# Patient Record
Sex: FEMALE | Race: WHITE | Employment: FULL TIME | URBAN - METROPOLITAN AREA
[De-identification: names, ages, dates, MRNs, and addresses within clinical notes are randomized per-mention and may not be internally consistent; named-entity substitution may affect disease eponyms.]

---

## 2019-05-17 LAB
C. TRACHOMATIS, EXTERNAL RESULT: NEGATIVE
N. GONORRHOEAE, EXTERNAL RESULT: NEGATIVE

## 2019-06-14 LAB
ABO, EXTERNAL RESULT: NORMAL
HEP B, EXTERNAL RESULT: NEGATIVE
HIV, EXTERNAL RESULT: NEGATIVE
RH FACTOR, EXTERNAL RESULT: POSITIVE
RPR, EXTERNAL RESULT: NONREACTIVE
RUBELLA TITER, EXTERNAL RESULT: NORMAL

## 2019-10-26 ENCOUNTER — HOSPITAL ENCOUNTER (OUTPATIENT)
Dept: OBGYN | Age: 39
Discharge: HOME | End: 2019-10-26

## 2019-10-26 PROCEDURE — S9444 PARENTING CLASS: HCPCS

## 2019-12-05 LAB — GBS, EXTERNAL RESULT: NEGATIVE

## 2019-12-30 ENCOUNTER — APPOINTMENT (OUTPATIENT)
Dept: LABOR AND DELIVERY | Age: 39
End: 2019-12-30
Payer: COMMERCIAL

## 2019-12-30 ENCOUNTER — HOSPITAL ENCOUNTER (INPATIENT)
Age: 39
LOS: 2 days | Discharge: HOME | End: 2020-01-01
Attending: OBSTETRICS & GYNECOLOGY | Admitting: OBSTETRICS & GYNECOLOGY
Payer: COMMERCIAL

## 2019-12-30 PROBLEM — Z34.80 NORMAL PREGNANCY IN MULTIGRAVIDA: Status: ACTIVE | Noted: 2019-12-30

## 2019-12-30 LAB
AMPHETAMINE SCREEN, URINE: NORMAL
BANDED NEUTROPHILS RELATIVE PERCENT: 19 % (ref 0–7)
BARBITURATE SCREEN URINE: NORMAL
BASOPHILS ABSOLUTE: 0.1 K/UL (ref 0–0.2)
BASOPHILS RELATIVE PERCENT: 1 %
BENZODIAZEPINE SCREEN, URINE: NORMAL
BUPRENORPHINE URINE: NORMAL
CANNABINOID SCREEN URINE: NORMAL
COCAINE METABOLITE SCREEN URINE: NORMAL
EOSINOPHILS ABSOLUTE: 0.1 K/UL (ref 0–0.6)
EOSINOPHILS RELATIVE PERCENT: 1 %
HCT VFR BLD CALC: 35.6 % (ref 36–48)
HEMOGLOBIN: 12.1 G/DL (ref 12–16)
LYMPHOCYTES ABSOLUTE: 3.4 K/UL (ref 1–5.1)
LYMPHOCYTES RELATIVE PERCENT: 29 %
Lab: NORMAL
MCH RBC QN AUTO: 31.3 PG (ref 26–34)
MCHC RBC AUTO-ENTMCNC: 34 G/DL (ref 31–36)
MCV RBC AUTO: 92 FL (ref 80–100)
METHADONE SCREEN, URINE: NORMAL
MONOCYTES ABSOLUTE: 0.1 K/UL (ref 0–1.3)
MONOCYTES RELATIVE PERCENT: 1 %
MYELOCYTE PERCENT: 1 %
NEUTROPHILS ABSOLUTE: 8 K/UL (ref 1.7–7.7)
NEUTROPHILS RELATIVE PERCENT: 48 %
OPIATE SCREEN URINE: NORMAL
OXYCODONE URINE: NORMAL
PDW BLD-RTO: 13.5 % (ref 12.4–15.4)
PH UA: 5
PHENCYCLIDINE SCREEN URINE: NORMAL
PLATELET # BLD: 246 K/UL (ref 135–450)
PLATELET SLIDE REVIEW: ADEQUATE
PMV BLD AUTO: 7.9 FL (ref 5–10.5)
PROPOXYPHENE SCREEN: NORMAL
RBC # BLD: 3.86 M/UL (ref 4–5.2)
SLIDE REVIEW: ABNORMAL
SPECIMEN STATUS: NORMAL
TOTAL SYPHILLIS IGG/IGM: NORMAL
WBC # BLD: 11.8 K/UL (ref 4–11)

## 2019-12-30 PROCEDURE — 6360000002 HC RX W HCPCS

## 2019-12-30 PROCEDURE — 86780 TREPONEMA PALLIDUM: CPT

## 2019-12-30 PROCEDURE — 85025 COMPLETE CBC W/AUTO DIFF WBC: CPT

## 2019-12-30 PROCEDURE — 6360000002 HC RX W HCPCS: Performed by: ADVANCED PRACTICE MIDWIFE

## 2019-12-30 PROCEDURE — 80307 DRUG TEST PRSMV CHEM ANLYZR: CPT

## 2019-12-30 PROCEDURE — 2580000003 HC RX 258

## 2019-12-30 PROCEDURE — 10907ZC DRAINAGE OF AMNIOTIC FLUID, THERAPEUTIC FROM PRODUCTS OF CONCEPTION, VIA NATURAL OR ARTIFICIAL OPENING: ICD-10-PCS | Performed by: OBSTETRICS & GYNECOLOGY

## 2019-12-30 PROCEDURE — 7200000001 HC VAGINAL DELIVERY

## 2019-12-30 PROCEDURE — 3E033VJ INTRODUCTION OF OTHER HORMONE INTO PERIPHERAL VEIN, PERCUTANEOUS APPROACH: ICD-10-PCS | Performed by: OBSTETRICS & GYNECOLOGY

## 2019-12-30 PROCEDURE — 1220000000 HC SEMI PRIVATE OB R&B

## 2019-12-30 RX ORDER — SODIUM CHLORIDE, SODIUM LACTATE, POTASSIUM CHLORIDE, CALCIUM CHLORIDE 600; 310; 30; 20 MG/100ML; MG/100ML; MG/100ML; MG/100ML
INJECTION, SOLUTION INTRAVENOUS CONTINUOUS
Status: DISCONTINUED | OUTPATIENT
Start: 2019-12-30 | End: 2020-01-01 | Stop reason: HOSPADM

## 2019-12-30 RX ORDER — IBUPROFEN 800 MG/1
800 TABLET ORAL EVERY 8 HOURS
Status: DISCONTINUED | OUTPATIENT
Start: 2019-12-31 | End: 2020-01-01 | Stop reason: HOSPADM

## 2019-12-30 RX ORDER — ACETAMINOPHEN 325 MG/1
650 TABLET ORAL EVERY 4 HOURS PRN
Status: DISCONTINUED | OUTPATIENT
Start: 2019-12-30 | End: 2019-12-30

## 2019-12-30 RX ORDER — DOCUSATE SODIUM 100 MG/1
100 CAPSULE, LIQUID FILLED ORAL 2 TIMES DAILY
Status: DISCONTINUED | OUTPATIENT
Start: 2019-12-30 | End: 2019-12-30

## 2019-12-30 RX ORDER — SODIUM CHLORIDE 0.9 % (FLUSH) 0.9 %
10 SYRINGE (ML) INJECTION PRN
Status: DISCONTINUED | OUTPATIENT
Start: 2019-12-30 | End: 2019-12-30

## 2019-12-30 RX ORDER — SODIUM CHLORIDE, SODIUM LACTATE, POTASSIUM CHLORIDE, CALCIUM CHLORIDE 600; 310; 30; 20 MG/100ML; MG/100ML; MG/100ML; MG/100ML
INJECTION, SOLUTION INTRAVENOUS
Status: COMPLETED
Start: 2019-12-30 | End: 2019-12-30

## 2019-12-30 RX ORDER — ONDANSETRON 2 MG/ML
4 INJECTION INTRAMUSCULAR; INTRAVENOUS EVERY 6 HOURS PRN
Status: DISCONTINUED | OUTPATIENT
Start: 2019-12-30 | End: 2019-12-30

## 2019-12-30 RX ORDER — SODIUM CHLORIDE 0.9 % (FLUSH) 0.9 %
10 SYRINGE (ML) INJECTION PRN
Status: DISCONTINUED | OUTPATIENT
Start: 2019-12-30 | End: 2020-01-01 | Stop reason: HOSPADM

## 2019-12-30 RX ORDER — ACETAMINOPHEN 325 MG/1
650 TABLET ORAL EVERY 4 HOURS PRN
Status: DISCONTINUED | OUTPATIENT
Start: 2019-12-30 | End: 2020-01-01 | Stop reason: HOSPADM

## 2019-12-30 RX ORDER — LANOLIN 100 %
OINTMENT (GRAM) TOPICAL PRN
Status: DISCONTINUED | OUTPATIENT
Start: 2019-12-30 | End: 2020-01-01 | Stop reason: HOSPADM

## 2019-12-30 RX ORDER — DOCUSATE SODIUM 100 MG/1
100 CAPSULE, LIQUID FILLED ORAL 2 TIMES DAILY
Status: DISCONTINUED | OUTPATIENT
Start: 2019-12-30 | End: 2020-01-01 | Stop reason: HOSPADM

## 2019-12-30 RX ORDER — SODIUM CHLORIDE 0.9 % (FLUSH) 0.9 %
10 SYRINGE (ML) INJECTION EVERY 12 HOURS SCHEDULED
Status: DISCONTINUED | OUTPATIENT
Start: 2019-12-30 | End: 2020-01-01 | Stop reason: HOSPADM

## 2019-12-30 RX ORDER — KETOROLAC TROMETHAMINE 30 MG/ML
INJECTION, SOLUTION INTRAMUSCULAR; INTRAVENOUS
Status: COMPLETED
Start: 2019-12-30 | End: 2019-12-30

## 2019-12-30 RX ORDER — SODIUM CHLORIDE, SODIUM LACTATE, POTASSIUM CHLORIDE, CALCIUM CHLORIDE 600; 310; 30; 20 MG/100ML; MG/100ML; MG/100ML; MG/100ML
INJECTION, SOLUTION INTRAVENOUS CONTINUOUS
Status: DISCONTINUED | OUTPATIENT
Start: 2019-12-30 | End: 2019-12-30

## 2019-12-30 RX ADMIN — SODIUM CHLORIDE, POTASSIUM CHLORIDE, SODIUM LACTATE AND CALCIUM CHLORIDE: 600; 310; 30; 20 INJECTION, SOLUTION INTRAVENOUS at 09:05

## 2019-12-30 RX ADMIN — SODIUM CHLORIDE, SODIUM LACTATE, POTASSIUM CHLORIDE, CALCIUM CHLORIDE: 600; 310; 30; 20 INJECTION, SOLUTION INTRAVENOUS at 09:05

## 2019-12-30 RX ADMIN — KETOROLAC TROMETHAMINE 30 MG: 30 INJECTION, SOLUTION INTRAMUSCULAR at 18:05

## 2019-12-30 RX ADMIN — Medication 1 MILLI-UNITS/MIN: at 09:15

## 2019-12-30 ASSESSMENT — PAIN SCALES - GENERAL: PAINLEVEL_OUTOF10: 5

## 2019-12-30 NOTE — H&P
phone: Not on file     Gets together: Not on file     Attends [employer] service: Not on file     Active member of club or organization: Not on file     Attends meetings of clubs or organizations: Not on file     Relationship status: Not on file    Intimate partner violence:     Fear of current or ex partner: Not on file     Emotionally abused: Not on file     Physically abused: Not on file     Forced sexual activity: Not on file   Other Topics Concern    Not on file   Social History Narrative    Not on file     Family History:       Problem Relation Age of Onset    Cancer Mother     Breast Cancer Maternal Aunt     Cancer Maternal Grandfather     Breast Cancer Paternal Cousin      Medications Prior to Admission:  Medications Prior to Admission: Fexofenadine HCl (ALLEGRA ALLERGY PO), Take by mouth  Multiple Vitamins-Minerals (WOMENS MULTIVITAMIN PLUS PO), Take  by mouth. fluticasone (FLONASE) 50 MCG/ACT nasal spray, 2 sprays by Nasal route daily. REVIEW OF SYSTEMS:    N/A    PHYSICAL EXAM:  Vitals:    12/30/19 0900 12/30/19 0901 12/30/19 0958   BP:  112/72 109/77   Pulse:  88 93   Resp:  16 16   Temp: 98 °F (36.7 °C)     TempSrc: Oral       General appearance:  awake, alert, cooperative, no apparent distress, and appears stated age  Neurologic:  Awake, alert, oriented to name, place and time. Lungs:  No increased work of breathing, good air exchange  Abdomen:  Soft, non tender, gravid, consistent with her gestational age, EFW by Leopold's maneuver was 8lbs   Fetal heart rate:  Reassuring.   Pelvis:  Adequate pelvis  Cervix: 2/50/-2  Contraction frequency:  irregular    Membranes:  Intact    Labs:   CBC with Differential:    Lab Results   Component Value Date    WBC 11.8 12/30/2019    RBC 3.86 12/30/2019    HGB 12.1 12/30/2019    HCT 35.6 12/30/2019     12/30/2019    MCV 92.0 12/30/2019    MCH 31.3 12/30/2019    MCHC 34.0 12/30/2019    RDW 13.5 12/30/2019    BANDSPCT 19 12/30/2019    LYMPHOPCT 29.0

## 2019-12-30 NOTE — L&D DELIVERY NOTE
Department of Obstetrics and Gynecology  Spontaneous Vaginal Delivery Note         Pre-operative Diagnosis:  Term pregnancy, Induced labor, Single fetus and Uncomplicated pregnancy    Post-operative Diagnosis:  Living  infant(s) and Male    Procedure:  Spontaneous vaginal delivery    Surgeon:   Dea Litten SNM / LOUIE Douglass CNM    Information for the patient's :  Chuck Theodore [[id number]]          Anesthesia:  none    Estimated blood loss:  150    Specimen:  Placenta not sent to pathology     Cord blood sent Yes    Complications:  none    Condition:  infant stable to general nursery and mother stable    Details of Procedure: The patient is a 42 y.o. female at 38w11d   OB History        3    Para   2    Term   2            AB        Living   2       SAB        TAB        Ectopic        Molar        Multiple        Live Births   2             who was admitted for induction. She received the following interventions: ARBOW and IV Pitocin induction She was known to be GBS negative and did not receive antibiotic prophylaxis. The patient progressed well,did not receive an epidural, became complete and started to push. After pushing for 5 minutes the fetal head was at the perineum, and the rest of the infant delivered atraumatically while mother was in standing positon and then placed in her arms with the assistance of the nursery nurse. Cord was clamped and cut by FOB. The delivery of the placenta was spontaneous. The perineum and vagina were explored and found to be intact. Dr. Nerissa Younger aware of delivery.

## 2019-12-31 LAB
HCT VFR BLD CALC: 31.9 % (ref 36–48)
HEMOGLOBIN: 10.8 G/DL (ref 12–16)

## 2019-12-31 PROCEDURE — 6370000000 HC RX 637 (ALT 250 FOR IP): Performed by: ADVANCED PRACTICE MIDWIFE

## 2019-12-31 PROCEDURE — 2580000003 HC RX 258: Performed by: ADVANCED PRACTICE MIDWIFE

## 2019-12-31 PROCEDURE — 1220000000 HC SEMI PRIVATE OB R&B

## 2019-12-31 PROCEDURE — 85018 HEMOGLOBIN: CPT

## 2019-12-31 PROCEDURE — 36415 COLL VENOUS BLD VENIPUNCTURE: CPT

## 2019-12-31 PROCEDURE — 85014 HEMATOCRIT: CPT

## 2019-12-31 RX ADMIN — IBUPROFEN 800 MG: 800 TABLET, FILM COATED ORAL at 08:44

## 2019-12-31 RX ADMIN — IBUPROFEN 800 MG: 800 TABLET, FILM COATED ORAL at 00:29

## 2019-12-31 RX ADMIN — ACETAMINOPHEN 650 MG: 325 TABLET ORAL at 14:46

## 2019-12-31 RX ADMIN — DOCUSATE SODIUM 100 MG: 100 CAPSULE, LIQUID FILLED ORAL at 08:44

## 2019-12-31 RX ADMIN — DOCUSATE SODIUM 100 MG: 100 CAPSULE, LIQUID FILLED ORAL at 22:38

## 2019-12-31 RX ADMIN — Medication 10 ML: at 08:45

## 2019-12-31 RX ADMIN — IBUPROFEN 800 MG: 800 TABLET, FILM COATED ORAL at 17:09

## 2019-12-31 ASSESSMENT — PAIN SCALES - GENERAL
PAINLEVEL_OUTOF10: 4
PAINLEVEL_OUTOF10: 1
PAINLEVEL_OUTOF10: 3

## 2019-12-31 NOTE — LACTATION NOTE
This note was copied from a baby's chart. Introduced self to patient as Lactation RN, name and phone number written on white board in room. Assisted mother with latching infant to right breast in football hold, emphasized the importance of positioning and obtaining a deep latch. Infant observed with ANT, SRS and AS. Breastfeeding education initiated. Reviewed with mother what to expect over the next  24-48 hours with infant feedings, infant output, and breast care. Educated mother on how to hand express colostrum. Reviewed infant feeding cues and encouraged mother to allow infant to breast feed on demand, a minimum of 8-12 times a day after the first day of life. Also encouraged mother to avoid giving infant a pacifier or bottle for at least the first two weeks of life. Binder and breast feeding log reviewed, all questions answered. Mother instructed to call Lactation nurse for F/U care as needed.

## 2019-12-31 NOTE — DISCHARGE SUMMARY
Obstetric Discharge Summary    Admitting Diagnosis  IUP 39.5 weeks  OB History        3    Para   3    Term   3            AB        Living   3       SAB        TAB        Ectopic        Molar        Multiple   0    Live Births   3                Reasons for Admission on 2019  8:06 AM  Normal pregnancy in multigravida [Z34.80]  No comment available  Induction of Labor    Prenatal Procedures  See ACOG    Intrapartum Procedures                 Spontaneous Vaginal Delivery: See Labor and Delivery Summary       Postpartum Procedures  None    Postpartum/Operative Complications        Data  Information for the patient's :  Javier Guevara [[id number]]   male  Birth Weight: 7 lb 13.3 oz (3.553 kg)    Discharge With Mother  Complications: No    Discharge Diagnosis   Multip s/p -stable    Discharge Information  Current Discharge Medication List      CONTINUE these medications which have NOT CHANGED    Details   Multiple Vitamins-Minerals (WOMENS MULTIVITAMIN PLUS PO) Take  by mouth. STOP taking these medications       Fexofenadine HCl (ALLEGRA ALLERGY PO) Comments:   Reason for Stopping:         fluticasone (FLONASE) 50 MCG/ACT nasal spray Comments:   Reason for Stopping:               No discharge procedures on file. Discharge to: Home  Follow up in 6 weeks at [facility]. Accompanied by .     Discharge Date: 19 Time:       Comments

## 2020-01-01 VITALS
RESPIRATION RATE: 16 BRPM | HEART RATE: 75 BPM | TEMPERATURE: 98.2 F | SYSTOLIC BLOOD PRESSURE: 112 MMHG | DIASTOLIC BLOOD PRESSURE: 76 MMHG

## 2020-01-01 PROCEDURE — 6370000000 HC RX 637 (ALT 250 FOR IP): Performed by: ADVANCED PRACTICE MIDWIFE

## 2020-01-01 RX ADMIN — DOCUSATE SODIUM 100 MG: 100 CAPSULE, LIQUID FILLED ORAL at 09:11

## 2020-01-01 RX ADMIN — IBUPROFEN 800 MG: 800 TABLET, FILM COATED ORAL at 09:10

## 2020-01-01 RX ADMIN — IBUPROFEN 800 MG: 800 TABLET, FILM COATED ORAL at 00:58

## 2020-01-01 ASSESSMENT — PAIN SCALES - GENERAL
PAINLEVEL_OUTOF10: 1
PAINLEVEL_OUTOF10: 3

## 2020-01-01 NOTE — PLAN OF CARE
Problem: Fluid Volume - Imbalance:  Goal: Absence of imbalanced fluid volume signs and symptoms  Description  Absence of imbalanced fluid volume signs and symptoms  Outcome: Ongoing  Goal: Absence of postpartum hemorrhage signs and symptoms  Description  Absence of postpartum hemorrhage signs and symptoms  Outcome: Ongoing     Problem: Infection - Risk of, Puerperal Infection:  Goal: Will show no infection signs and symptoms  Description  Will show no infection signs and symptoms  Outcome: Ongoing

## 2020-01-01 NOTE — PROGRESS NOTES
Department of Obstetrics and Gynecology  Labor and Delivery  CNM Post Partum Progress Note      SUBJECTIVE:  Doing well, pain controlled with motrin. Tolerating reg diet, voiding, ambulating, breastfeeding well. Needed to stay for baby +carlos manuel. OBJECTIVE:      Vitals:  /77   Pulse 76   Temp 98.2 °F (36.8 °C) (Oral)   Resp 18   LMP 2019   Breastfeeding? Unknown     abd-soft, NT  [email or web address]  [email or web address]      DATA:    CBC:    Lab Results   Component Value Date    WBC 11.8 2019    RBC 3.86 2019    HGB 10.8 2019    HCT 31.9 2019    MCV 92.0 2019    RDW 13.5 2019     2019       ASSESSMENT:  Multip s/p   PP day 2  Stable male   Breastfeeding         PLAN:    D/c to home  F/u office 6 weeks      Principal Problem:    Vaginal delivery  Plan:    Active Problems:    Normal pregnancy in multigravida  Plan:      (normal spontaneous vaginal delivery)  Plan:
Department of Obstetrics and Gynecology  Labor and Delivery  CNM Post Partum Progress Note      SUBJECTIVE:  Doing well, voiding, ambulating, tolerating reg diet, breastfeeding well. Desires to go home today. Desires Hudson to be circumcised. Pain controlled with motrin. OBJECTIVE:      Vitals:  /70   Pulse 93   Temp 98.4 °F (36.9 °C) (Axillary)   Resp 16   LMP 2019   Breastfeeding? Unknown     abd-soft, NT  [email or web address]  Lochia-small, rubra  Perineum-neg edema      DATA:    pending    ASSESSMENT:  Multip s/p   PP day 1  Stable male   Breastfeeding         PLAN:    D/c to home after 24 hours  F/u office 6 weeks    Principal Problem:    Vaginal delivery  Plan:    Active Problems:    Normal pregnancy in multigravida  Plan:      (normal spontaneous vaginal delivery)  Plan:
LOUIE Gleason CNM and LASHAE Resendiz at bedside. SVE performed per patient request. AROM with return of clear/pinkish fluid. Tolerated well.
LOUIE Gleason CNM at bedside discussing plan of care. SVE performed. RN to begin pitocin induction.
LOUIE Gleason CNM called wanting update on patient status. RN to call CNM when patient is getting more active. Will continue to monitor.
Patient grunting with contractions. Midwives and RN remain at bedside. Room ready for impending delivery including delivery table, scale cart and RN to care for infant at delivery.
Patient in pool for hydrotherapy at this time. Midwife aware.
Patient out of pool at this time.
Patient up for the first time with the help of 2 RNs. Patient tolerated well. Linens were changed, pericare performed, panties applied and  Pad changed. IV discontinued with angiocath intact. Patient voided 100 ml without difficulty. Patient moved to room 318 via wheelchair alongside infant. Patient is pink and stable .  Demetria Schilling
Pt continues to breathe through ctx. Requesting AROM.     VSS  FHTs 130s, moderate variability, +accels, no decels  UCs q3-4  Pit @ 6mu  VE 3/50/-1    Early labor  Cat 1 FHR    AROM'd for small amount of clear fluid, bloody show  Continue to monitor    Judy Hyde [place], [place]
Pt given d/c instructions. Pt verbalizes understanding. No iv access. No prescriptions. Pt is to make 6 week follow up appt. All questions answered.
Pt states ctx are about 3 minutes apart with increasing discomfort. Requesting to have VE at 1400.     VSS  FHTs 130s, moderate variabiliity, +accels, no decels  UCs q3-4min  Pit @ 6mu    Elective IOL  Cat FHR    Continue to titrate pitocin as needed  Continue to monitor
SVE performed per patient request. Continues to breathe well with contractions.
Do you have any other questions or concerns I can address today?  Y/N  __________________________________________________      Teaching During interview :_____________________________________________  ___________________________RN       Date:______________Time:________________

## 2023-11-29 ENCOUNTER — PROCEDURE VISIT (OUTPATIENT)
Dept: SURGERY | Age: 43
End: 2023-11-29

## 2023-11-29 VITALS — DIASTOLIC BLOOD PRESSURE: 82 MMHG | SYSTOLIC BLOOD PRESSURE: 109 MMHG | HEART RATE: 87 BPM

## 2023-11-29 DIAGNOSIS — C44.41 BASAL CELL CARCINOMA OF SCALP: Primary | ICD-10-CM

## 2023-11-29 RX ORDER — SERTRALINE HYDROCHLORIDE 25 MG/1
25 TABLET, FILM COATED ORAL DAILY
COMMUNITY
Start: 2017-10-16 | End: 2024-02-15

## 2023-11-29 NOTE — PROGRESS NOTES
PRE-PROCEDURE SCREENING    Pacemaker/ICD: No  Difficulty with numbing in the past: No  Local Anesthesia Reaction/passing out: No  Latex or adhesive allergy:  No  Bleeding/Clotting Disorders: No  Anticoagulant Therapy: No  Joint prosthesis: No  Artificial Heart Valve: No  Stroke or Seizures: No  Organ Transplant or Lymphoma: No  Immunosuppression: No  Respiratory Problems: No
applied, and the wound was covered. A dressing was applied for stabilization and light pressure. The patient was given detailed oral and written instructions on postoperative care. There were no complications. The patient left the Unit in good medical condition. FOLLOW-UP:  The patient will return for suture removal in 14 days.

## 2023-11-29 NOTE — PATIENT INSTRUCTIONS
[facility] Office Hours:    Monday-Thursday  7:30 AM-4:30 PM    Friday  9:00 AM-1:00 PM       POST-OPERATIVE CARE FOR STITCHES  Bandage change after 48 hours    CARING FOR YOUR SURGICAL SITE  The bandage should remain on and completely dry for 48 hours. Do NOT get the bandage wet. After the first 48 hours, gently remove the remaining part of the bandage. It can be helpful to moisten the bandage edges in the shower. Steri strips may still be on the wound. It is ok, they will fall off slowly with the daily bandage changes. Gently clean the wound daily with mild soap and water. Try to clean off crust and debris. Dry (pat) the area with a clean Q-tip or gauze. Apply a layer of Vaseline/ Aquaphor (or Bacitracin if your doctor recommends) to the wound area only. Cut a piece of Telfa (or any non-stick dressing) to fit just over the wound and secure it with paper tape. If the wound is small you may use a Band- Aid. Keep area covered for a total of 2 week(s). If the dressing comes off or if you have questions, or concerns about the dressing, please call the office for instructions! POST OPERATIVE INSTRUCTIONS    Activity: Do not lift anything heavier than a gallon of milk for 1 week. Also, avoid strenuous activity such as running, power walking or contact sports. Eating and drinking: Do not drink alcohol for 48 hours after your procedure. Alcohol increases the chances of bleeding. Medicines   -If you have discomfort, take Acetaminophen (Tylenol or Extra Strength Tylenol). Follow the instructions and warning on the bottle. -If your doctor has prescribed you an Aspirin daily, please keep taking it. Do not take extra Aspirin or medicines containing Aspirin (such as Evro-[place] and Excedrin) for 48 hours after your procedure. Bleeding: If bleeding occurs, DO NOT remove the bandage. Put firm pressure on the area with gauze for 20 minutes without peeking.  If the bleeding continues, apply

## 2023-11-30 ENCOUNTER — TELEPHONE (OUTPATIENT)
Dept: SURGERY | Age: 43
End: 2023-11-30

## 2023-11-30 NOTE — TELEPHONE ENCOUNTER
The patient was in the office on 11/29/23 for MOHS procedure located on the Left Frontal Scalp  with ILC repair. The patient tolerated the procedure well and left the office in good condition. A post-operative telephone call was placed at 10:03 a.m. in order to check on the patient's recovery process. The patient was not able to be reached and a phone message was left.

## 2023-12-12 ENCOUNTER — NURSE ONLY (OUTPATIENT)
Dept: SURGERY | Age: 43
End: 2023-12-12

## 2023-12-12 DIAGNOSIS — Z48.02 VISIT FOR SUTURE REMOVAL: Primary | ICD-10-CM

## 2023-12-12 NOTE — PROGRESS NOTES
S:  The patient is here for suture removal s/p Mohs surgery on the left frontal scalp and Intermediate layered closure repair, 2 week(s) ago. The site appears well-healed without signs of infection (redness, pain or discharge). The sutures were removed. Wound care and activity instructions given. The patient was scheduled for follow-up PRN for scar/wound check. The patient was scheduled for f/u with General Dermatology per their instructions.